# Patient Record
Sex: FEMALE | Race: WHITE
[De-identification: names, ages, dates, MRNs, and addresses within clinical notes are randomized per-mention and may not be internally consistent; named-entity substitution may affect disease eponyms.]

---

## 2018-02-19 ENCOUNTER — HOSPITAL ENCOUNTER (OUTPATIENT)
Dept: HOSPITAL 88 - DX | Age: 83
End: 2018-02-19
Attending: FAMILY MEDICINE
Payer: MEDICARE

## 2018-02-19 DIAGNOSIS — M81.0: Primary | ICD-10-CM

## 2018-02-19 PROCEDURE — 77080 DXA BONE DENSITY AXIAL: CPT

## 2018-06-10 NOTE — DIAGNOSTIC IMAGING REPORT
PROCEDURE:BONE DXA DUAL ENERGY

INDICATION: Post menopausal osteoporosis screening The patient history 

questionnaire was completed and is available on PACS.

COMPARISON:None.

FINDINGS:

Evaluation of the left hip and lumbar spine was performed utilizing 

DEXA Hologic bone densitometer.

The study is technically adequate.

The patient's fracture risk is compared to an age-matched control.

 

The patient denies prior surgery/fracture of the spine, hips or 

forearm.

 

Left femoral neck bone mineral density: 0.761 g/cm2, T-score is -0.8, 

Z-score is 1.6.

Left total hip bone mineral density: 0.744 g/cm2, T-score is -1.6, 

Z-score is 0.5.

 

Lumbar spine total bone mineral density: 1.257 g/cm2, T-score is 1.9, 

Z-score is 4.7.

 

IMPRESSION:

1. Bone mineralization by WHO Classification is osteopenia, based on 

measurements obtained of the left hip. The fracture risk isn't 

increased.

 

2. Measured bone mineral density of the lumbar spine is probably 

artifactually increased due to degenerative osteophyte formation.

 

 

 

Dictated by:  Bernabe Frankel M.D. on 2/19/2018 at 11:11     

Electronically approved by:  Bernabe Frankel M.D. on 2/19/2018 at 11:11
Attending Only

## 2019-01-20 ENCOUNTER — HOSPITAL ENCOUNTER (INPATIENT)
Dept: HOSPITAL 88 - ER | Age: 84
LOS: 4 days | Discharge: NURSING, REHAB OR LONG-TERM CARE | DRG: 470 | End: 2019-01-24
Attending: INTERNAL MEDICINE | Admitting: INTERNAL MEDICINE
Payer: MEDICARE

## 2019-01-20 VITALS — HEIGHT: 68 IN | WEIGHT: 145.06 LBS | BODY MASS INDEX: 21.99 KG/M2
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
## 2019-01-20 DIAGNOSIS — Z82.49: ICD-10-CM

## 2019-01-20 DIAGNOSIS — N17.9: ICD-10-CM

## 2019-01-20 DIAGNOSIS — N18.1: ICD-10-CM
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
## 2019-01-20 DIAGNOSIS — E03.9: ICD-10-CM
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
## 2019-01-20 DIAGNOSIS — M15.9: ICD-10-CM

## 2019-01-20 DIAGNOSIS — S72.032A: Primary | ICD-10-CM

## 2019-01-20 DIAGNOSIS — I13.10: ICD-10-CM

## 2019-01-20 DIAGNOSIS — M81.0: ICD-10-CM

## 2019-01-20 PROCEDURE — 93005 ELECTROCARDIOGRAM TRACING: CPT

## 2019-01-20 PROCEDURE — 96374 THER/PROPH/DIAG INJ IV PUSH: CPT

## 2019-01-20 PROCEDURE — 71045 X-RAY EXAM CHEST 1 VIEW: CPT

## 2019-01-20 PROCEDURE — 97139 UNLISTED THERAPEUTIC PX: CPT

## 2019-01-20 PROCEDURE — 80053 COMPREHEN METABOLIC PANEL: CPT

## 2019-01-20 PROCEDURE — 96376 TX/PRO/DX INJ SAME DRUG ADON: CPT

## 2019-01-20 PROCEDURE — 86900 BLOOD TYPING SEROLOGIC ABO: CPT

## 2019-01-20 PROCEDURE — 99284 EMERGENCY DEPT VISIT MOD MDM: CPT

## 2019-01-20 PROCEDURE — 96375 TX/PRO/DX INJ NEW DRUG ADDON: CPT

## 2019-01-20 PROCEDURE — 85025 COMPLETE CBC W/AUTO DIFF WBC: CPT

## 2019-01-20 PROCEDURE — 85730 THROMBOPLASTIN TIME PARTIAL: CPT

## 2019-01-20 PROCEDURE — 51700 IRRIGATION OF BLADDER: CPT

## 2019-01-20 PROCEDURE — 85610 PROTHROMBIN TIME: CPT

## 2019-01-20 PROCEDURE — 81001 URINALYSIS AUTO W/SCOPE: CPT

## 2019-01-20 PROCEDURE — 36415 COLL VENOUS BLD VENIPUNCTURE: CPT

## 2019-01-20 PROCEDURE — 86850 RBC ANTIBODY SCREEN: CPT

## 2019-01-20 NOTE — XMS REPORT
Patient Summary Document

                             Created on: 2019



MANISH CASTLE

External Reference #: [id number]

: 1935

Sex: Female



Demographics







                          Address                   [address] [place] [address] 

[place]  [address]

 

                          Home Phone                [phone]

 

                          Preferred Language        Unknown

 

                          Marital Status            Unknown

 

                          Jainism Affiliation     Unknown

 

                          Race                      Unknown

 

                                        Additional Race(s)  

 

                          Ethnic Group              Unknown





Author







                          Author                    [facility]

 

                          Address                   Unknown

 

                          Phone                     Unavailable







Care Team Providers







                    Care Team Member Name    Role                Phone

 

                    ERIBERTO YOUNG        Unavailable         Unavailable







Problems

This patient has no known problems.



Allergies, Adverse Reactions, Alerts

This patient has no known allergies or adverse reactions.



Medications

This patient has no known medications.



Results







           Test Description    Test Time    Test Comments    Text Results    Atomic Results    Result

 Comments

 

                BONE DXA DUAL ENERGY                                        [place] [id number]      Patient Name: MANISH CASTLE   
MR #: [id number]    : 1935 Age/Sex: 82/F  Acct #: [id number] Req #: 
[id number]  [facility] Physician:     Ordered by: ERIBERTO YOUNG MD  Report #: [id number]  
Location: DX  Room/Bed:     
_________________________________________________________________________________
__________________    Procedure: [id number] DX/BONE DXA DUAL ENERGY  Exam Date:  
                          Exam Time:        REPORT STATUS: Signed    PROCEDURE: 
 BONE DXA DUAL ENERGY   INDICATION: Post menopausal osteoporosis screening The 
patient history    questionnaire was completed and is available on PACS.   
COMPARISON:   None.   FINDINGS:      Evaluation of the left hip and lumbar spine
was performed utilizing    DEXA Hologic bone densitometer.   The study is 
technically adequate.   The patient's fracture risk is compared to an age-
matched control.       The patient denies prior surgery/fracture of the spine, 
hips or    forearm.       Left femoral neck bone mineral density: 0.761 g/cm2, 
T-score is -0.8,    Z-score is 1.6.   Left total hip bone mineral density: 0.744
g/cm2, T-score is -1.6,    Z-score is 0.5.       Lumbar spine total bone mineral
density: 1.257 g/cm2, T-score is 1.9,    Z-score is 4.7.       IMPRESSION:      
1. Bone mineralization by WHO Classification is osteopenia, based on    
measurements obtained of the left hip. The fracture risk isn't    increased.    
  2. Measured bone mineral density of the lumbar spine is probably    
artifactually increased due to degenerative osteophyte formation.               
Dictated by:  Miryam Flores M.D. on 2018 at 11:11        Electronically 
approved by:  Miryam Flores M.D. on 2018 at 11:11                   
Dictated By: MIRYAM FLORES MD  Electronically Signed By: MIRYAM FLORES MD 
on 18 1111  Transcribed By: JENNIFFER on 18 1111       COPY TO:   
ERIBERTO YOUNG MD

## 2019-01-21 VITALS — DIASTOLIC BLOOD PRESSURE: 58 MMHG | SYSTOLIC BLOOD PRESSURE: 103 MMHG

## 2019-01-21 VITALS — SYSTOLIC BLOOD PRESSURE: 132 MMHG | DIASTOLIC BLOOD PRESSURE: 66 MMHG

## 2019-01-21 VITALS — SYSTOLIC BLOOD PRESSURE: 113 MMHG | DIASTOLIC BLOOD PRESSURE: 57 MMHG

## 2019-01-21 LAB
ALBUMIN SERPL-MCNC: 3.3 G/DL (ref 3.5–5)
ALBUMIN/GLOB SERPL: 1 {RATIO} (ref 0.8–2)
ALP SERPL-CCNC: 50 IU/L (ref 40–150)
ALT SERPL-CCNC: 14 IU/L (ref 0–55)
ANION GAP SERPL CALC-SCNC: 14.9 MMOL/L (ref 8–16)
BACTERIA URNS QL MICRO: (no result) /HPF
BASOPHILS # BLD AUTO: 0.1 10*3/UL (ref 0–0.1)
BASOPHILS NFR BLD AUTO: 0.8 % (ref 0–1)
BILIRUB UR QL: NEGATIVE
BUN SERPL-MCNC: 21 MG/DL (ref 7–26)
BUN/CREAT SERPL: 20 (ref 6–25)
CALCIUM SERPL-MCNC: 9.1 MG/DL (ref 8.4–10.2)
CHLORIDE SERPL-SCNC: 100 MMOL/L (ref 98–107)
CLARITY UR: CLEAR
CO2 SERPL-SCNC: 28 MMOL/L (ref 22–29)
COLOR UR: YELLOW
DEPRECATED APTT PLAS QN: 33.7 SECONDS (ref 23.8–35.5)
DEPRECATED INR PLAS: 1.01
DEPRECATED NEUTROPHILS # BLD AUTO: 3.5 10*3/UL (ref 2.1–6.9)
DEPRECATED RBC URNS MANUAL-ACNC: (no result) /HPF (ref 0–5)
EGFRCR SERPLBLD CKD-EPI 2021: 50 ML/MIN (ref 60–?)
EOSINOPHIL # BLD AUTO: 0.6 10*3/UL (ref 0–0.4)
EOSINOPHIL NFR BLD AUTO: 8.5 % (ref 0–6)
EPI CELLS URNS QL MICRO: (no result) /LPF
ERYTHROCYTE [DISTWIDTH] IN CORD BLOOD: 13.9 % (ref 11.7–14.4)
GLOBULIN PLAS-MCNC: 3.2 G/DL (ref 2.3–3.5)
GLUCOSE SERPLBLD-MCNC: 90 MG/DL (ref 74–118)
HCT VFR BLD AUTO: 33.4 % (ref 34.2–44.1)
HGB BLD-MCNC: 12 G/DL (ref 12–16)
HYALINE CASTS #/AREA URNS LPF: (no result) /[LPF] (ref 0–1)
KETONES UR QL STRIP.AUTO: (no result)
LEUKOCYTE ESTERASE UR QL STRIP.AUTO: NEGATIVE
LYMPHOCYTES # BLD: 1.8 10*3/UL (ref 1–3.2)
LYMPHOCYTES NFR BLD AUTO: 27.8 % (ref 18–39.1)
MCH RBC QN AUTO: 32.8 PG (ref 28–32)
MCHC RBC AUTO-ENTMCNC: 35.9 G/DL (ref 31–35)
MCV RBC AUTO: 91.3 FL (ref 81–99)
MONOCYTES # BLD AUTO: 0.6 10*3/UL (ref 0.2–0.8)
MONOCYTES NFR BLD AUTO: 9.4 % (ref 4.4–11.3)
MUCOUS THREADS URNS QL MICRO: (no result)
NEUTS SEG NFR BLD AUTO: 53 % (ref 38.7–80)
NITRITE UR QL STRIP.AUTO: NEGATIVE
PLATELET # BLD AUTO: 242 X10E3/UL (ref 140–360)
POTASSIUM SERPL-SCNC: 3.9 MMOL/L (ref 3.5–5.1)
PROT UR QL STRIP.AUTO: NEGATIVE
PROTHROMBIN TIME: 14.2 SECONDS (ref 11.9–14.5)
RBC # BLD AUTO: 3.66 X10E6/UL (ref 3.6–5.1)
SODIUM SERPL-SCNC: 139 MMOL/L (ref 136–145)
SP GR UR STRIP: 1.03 (ref 1.01–1.02)
UROBILINOGEN UR STRIP-MCNC: 0.2 MG/DL (ref 0.2–1)
WBC #/AREA URNS HPF: (no result) /HPF (ref 0–5)

## 2019-01-21 RX ADMIN — Medication SCH MG: at 10:48

## 2019-01-21 RX ADMIN — Medication SCH MG: at 15:13

## 2019-01-21 RX ADMIN — HYDROMORPHONE HYDROCHLORIDE PRN MG: 2 INJECTION INTRAMUSCULAR; INTRAVENOUS; SUBCUTANEOUS at 16:00

## 2019-01-21 RX ADMIN — SODIUM CHLORIDE PRN MG: 900 INJECTION INTRAVENOUS at 12:25

## 2019-01-21 RX ADMIN — HYDROMORPHONE HYDROCHLORIDE PRN MG: 2 INJECTION INTRAMUSCULAR; INTRAVENOUS; SUBCUTANEOUS at 06:30

## 2019-01-21 RX ADMIN — Medication SCH MG: at 21:15

## 2019-01-21 RX ADMIN — SODIUM CHLORIDE, POTASSIUM CHLORIDE, SODIUM LACTATE AND CALCIUM CHLORIDE SCH MLS/HR: 600; 310; 30; 20 INJECTION, SOLUTION INTRAVENOUS at 17:12

## 2019-01-21 RX ADMIN — HYDROMORPHONE HYDROCHLORIDE PRN MG: 2 INJECTION INTRAMUSCULAR; INTRAVENOUS; SUBCUTANEOUS at 19:50

## 2019-01-21 RX ADMIN — HYDROMORPHONE HYDROCHLORIDE PRN MG: 2 INJECTION INTRAMUSCULAR; INTRAVENOUS; SUBCUTANEOUS at 12:30

## 2019-01-21 NOTE — NUR
CONSULTATION - ORTHOPEDICS



Callie is a 83 year old community ambulating female without assistive device who 
presented to the ED after a mechanical fall with complaint of left hip pain and 
an inability to ambulate.  Beside her general soreness, she denies pain in any 
other extremity and denies numbness, paresthesias or loss of distal motor 
function.  Pain mainly localized to left hip. 



PMdHx: Hypothyroidism, Osteoporosis, Hypertensive heart disease, Stage 1 
chronic kidney disease, Generalized osteoarthritis.

SurgHx: Appendectomy, Tonsillectomy, Right ankle surgery, Right knee 
arthroscopy, Cataract surgery, Left heart catheterization

FamHx: Non-contributory

Allergies: NKDA. 

SocHx: Retired, Denies Tobacco, Alcohol and Drug Use 

Medications: See Med Reconciliation

 

VS:  T 97.9, HR 98, RR 16, /75, O2 Sat 98% (2.0L)



Alert, Awake & Oriented, NAD

Bilateral Upper Extremity

No open lesions or sores, no gross deformity

+ AROM of digits, wrist, elbow, shoulder

Motor: + AIN, PIN, Radial, Median, Ulnar

Sensation grossly intact

Pulses + Radial, + capillary refill

Compartments soft



Lower Extremity

Right Lower Extremity: No pain with log roll, heal strike, no gross deformity

Left Lower Extremity: Shortened, flexed, externally rotated

Motor: + EHL, FHL, TA, G/S

Sensation grossly intact

Pulses + DP, Post tib

Compartments soft

Negative calf tenderness



Xrays demonstrate displaced left femoral neck fracture



83 year old female with displaced left femoral neck fracture

Plan for OR in am for left hip hemiarthroplasty

Analgesics PRN

DVT Prophylaxis

Bedrest

NPO except meds after midnight

Hold anticoagulation after midnight

IVF while NPO

Medical and Cardiology notes appreciated.



DO [facility]

## 2019-01-21 NOTE — CONSULTATION
DATE OF CONSULTATION:  January 21, 2019 



CARDIOLOGY CONSULTATION 



ATTENDING PHYSICIAN:  Dr. Terry.



Thank you so much for asking me to see this nice lady again in 

consultation.



Ms. Cardoso is a charming 83-year-old woman seen in my office earlier this 

month, who presented to the emergency room overnight after falling down on 

her patio and fracturing her left hip.



HISTORY OF PRESENT ILLNESS:  The patient reports she went out to see the 

lunar eclipse and was having trouble seeing it but slipped down on a rubber 

mat and fractured her left hip. She denies any palpitations or any syncope. 





PAST MEDICAL HISTORY:  Significant for hypertension. She had a cardiac 

catheterization in 2010, found to have only minimal coronary disease. She 

has had previous left trigeminal neuralgia.



SURGICAL HISTORY:  Remote tonsillectomy and appendectomy.



HOME MEDICATIONS:  Have been Synthroid 50 mcg daily, calcium, vitamin D, 

tramadol, meloxicam, gabapentin 600 mg t.i.d., and Micardis 40 mg daily.



PERSONAL AND SOCIAL HISTORY:  She does not smoke nor drink. 



PHYSICAL EXAMINATION

GENERAL:  At this time shows a pleasant, elderly woman, alert, responsive, 

oriented, normotensive.

HEAD, EYES, EARS, NOSE AND THROAT:  Unremarkable.

NECK:  No jugular venous distention.

THORAX:  Heart sounds S1 and S2 are equal, no murmurs.

LUNGS:  Clear. 

ABDOMEN:  Scaphoid. Normal bowel sounds.

EXTREMITIES:  Have no cyanosis, clubbing or edema.



EKG shows sinus tachycardia.



INITIAL LABORATORY STUDIES:  Were unrevealing with a creatinine of 1.0. INR 

1.0. 



ASSESSMENTS

1. Left femoral neck fracture.

2. Hypertension, controlled.

3. No significant coronary disease.



PLAN:  I believe she is a standard risk for a hip repair for an 

83-year-old. Will continue home medications and follow with you.



Thank you for asking me to see her in consultation.



 





DD:  01/21/2019 12:10

DT:  01/21/2019 12:43

Job#:  [id number]

cc:MD DR. KALPANA ZIEGLER

## 2019-01-21 NOTE — DIAGNOSTIC IMAGING REPORT
EXAM: CHEST SINGLE (PORTABLE), AP 1 view

INDICATION: Fall, left hip pain

COMPARISON: None



FINDINGS:

LINES/TUBES: None



LUNGS: No consolidations or edema. 



PLEURA: No effusions or pneumothorax.



HEART AND MEDIASTINUM: The heart is within normal size limits. Prominence of

the right paratracheal stripe is likely secondary to rotation.



BONES AND SOFT TISSUES: No acute findings. 



IMPRESSION:

No acute thoracic abnormality.







Signed by: Dr. Kusum Lisa M.D. on 1/21/2019 1:27 AM

## 2019-01-21 NOTE — DIAGNOSTIC IMAGING REPORT
Exam: Left femur one view

Indication: Fall, left hip pain

Comparison: Pelvic and left hip x-rays January 21, 2019



Impression:

Acute fracture of the left proximal femoral neck with shortening. 

No additional femoral fractures.

No dislocation.



Signed by: Dr. Kusum Lisa M.D. on 1/21/2019 2:33 AM

## 2019-01-21 NOTE — DIAGNOSTIC IMAGING REPORT
EXAM: HIP LEFT 2-3 VW (+/- PELVIS)

INDICATION: Fall, left hip pain

COMPARISON: None



FINDINGS:

BONES: 

Acute fracture of the left proximal femoral neck with shortening.



JOINTS:

No dislocation. Degenerative changes of the bilateral hips and pubic symphysis

and sacroiliac joints and lower lumbar spine.



SOFT TISSUES:

Normal



IMPRESSION:

Acute fracture of the left proximal femoral neck.





Signed by: Dr. Kusum Lisa M.D. on 1/21/2019 1:26 AM

## 2019-01-21 NOTE — HISTORY AND PHYSICAL
CHIEF COMPLAINT:  "I fell down."



HISTORY OF PRESENT ILLNESS:  This is an 83-year-old white woman who 

presents to [facility] with complaints of 

intense left hip pain.  The patient states she suffered a mechanical fall 

last night at approximately 11 p.m. when she went outside to see the lunar 

eclipse.  The patient denies any syncopal or presyncopal symptoms prior to 

falling.  The patient states she has intense pain of the left hip in the 

left hip area.  In the emergency room, the patient had a left femur x-ray 

performed that revealed an acute fracture of the left proximal femoral neck 

with shortening.  The patient's complete blood count and comprehensive 

metabolic profile were unremarkable.  The patient was admitted for further 

evaluation and treatment.



REVIEW OF SYSTEMS 

GENERAL:  Weight has been stable.  No fever or chills.

HEENT:  No headaches.  No visual changes.  

CARDIOVASCULAR/RESPIRATORY:  The patient was recently diagnosed with 

pneumonia, but her cough and congestion has improved significantly.  The 

patient denies any chest pain or tiredness.  

GI:  No nausea, vomiting, constipation.

:  No dysuria.  No hematuria.  No constipation.

NEUROMUSCULAR:  Complains of left hip pain.



PAST MEDICAL HISTORY 

1.  Hypothyroidism.

2.  Osteoporosis. 

3.  Hypertensive heart disease.

4.  Stage 1 chronic kidney disease. 

5.  Generalized osteoarthritis.



SURGICAL HISTORY 

1.  Appendectomy.

2.  Tonsillectomy.

3.  Right ankle surgery.

4.  Right knee arthroscopy.

5.  Cataract surgery.

6.  Left heart catheterization, but no percutaneous coronary intervention 

performed.



FAMILY HISTORY:  Both parents have hypertension and mother had coronary 

artery bypass grafting.  



ALLERGIES:  NO KNOWN DRUG ALLERGIES. 



SOCIAL HISTORY:  The woman is  and lives with her .  She was 

exposed to second-hand tobacco smoke from her  up until 1980.  She 

is retired.  She denies any alcohol use.



MEDICATIONS 

1.  Meloxicam 15 mg daily.

2.  Levothyroxine 50 mcg daily.

3.  Tramadol 50 mg q.i.d. p.r.n. pain.

4.  Alendronate 70 mg daily.

5.  Gabapentin 600 mg daily.

6.  Losartan 40 mg daily.

7.  Vitamin D3 2000 units daily.

8.  Calcium with vitamin D t.i.d. 

9.  Multivitamin 1 daily.



PHYSICAL EXAMINATION 

GENERAL:  She is awake, alert, fully awake, and pleasant on exam.

VITALS:  Height is 5 feet 8 inches, weight is 130 pounds, BMI 19, blood 

pressure is 128/62, heart rate is 100, respiratory rate is 18, oxygen 

saturation 98% on room air, temp 97.8.

INTEGUMENT:  Skin is warm and dry.  No pallor, conjunctivitis or 

diaphoresis.

HEENT:  Anicteric.  Moist mucous membranes.

NECK:  Supple.



CARDIOVASCULAR:  Tachycardic rate with regular rhythm.  The patient has an 

S3 and S4 gallops.

LUNGS:  She has coarse breath sounds bilaterally, but no rales, rhonchi or 

wheezes appreciated. 

EXTREMITIES:  The patient's left lower extremity is shorter than the right 

and it is externally rotated.

NEUROLOGIC:  Intact.  



IMPRESSION 

1.  Left proximal femoral neck fracture secondary to mechanical fall.

2.  Hypertensive heart disease.

3.  Stage 1 chronic kidney disease.

4.  Osteoporosis. 

5.  Generalized osteoarthritis.



PLAN 

1.  Will order enoxaparin to prevent deep venous thrombosis.

2.  Regular diet.

3.  N.p.o. after midnight.

4.  I spoke with orthopedic surgery, and she will tentatively undergo left 

hip surgery tomorrow on Tuesday, January 22, 2019.  

5.  Pain control.

6.  Renew home medications.  



I spent an hour in the care of this patient. 

  





DD:  01/21/2019 09:55

DT:  01/21/2019 10:07

Job#:  [id number] SRINIVAS OSBORNE

## 2019-01-21 NOTE — NUR
THIS NURSE ATTEMPTED TO PLACE PATIENT ON HOSPITAL BED, FAMILY IN ROOM SAID THE 
PATIENT IS RESTING PEACFULLY AND THEY WILL LET US KNOW WHEN THEY ARE READY. 
EXPLAINED THE RISK OF SKIN BREAKDOWN. PATIENT WAS ASKED IF SHE WANTED TO STAY 
IN ER BED, PATIENT STATED SHE DID NOT WANT TO BE MOVED TO HOSPITAL BED UNTIL 
LATER.

## 2019-01-22 VITALS — DIASTOLIC BLOOD PRESSURE: 58 MMHG | SYSTOLIC BLOOD PRESSURE: 131 MMHG

## 2019-01-22 VITALS — SYSTOLIC BLOOD PRESSURE: 120 MMHG | DIASTOLIC BLOOD PRESSURE: 61 MMHG

## 2019-01-22 VITALS — SYSTOLIC BLOOD PRESSURE: 96 MMHG | DIASTOLIC BLOOD PRESSURE: 55 MMHG

## 2019-01-22 VITALS — SYSTOLIC BLOOD PRESSURE: 138 MMHG | DIASTOLIC BLOOD PRESSURE: 74 MMHG

## 2019-01-22 VITALS — SYSTOLIC BLOOD PRESSURE: 113 MMHG | DIASTOLIC BLOOD PRESSURE: 74 MMHG

## 2019-01-22 VITALS — SYSTOLIC BLOOD PRESSURE: 131 MMHG | DIASTOLIC BLOOD PRESSURE: 58 MMHG

## 2019-01-22 VITALS — DIASTOLIC BLOOD PRESSURE: 62 MMHG | SYSTOLIC BLOOD PRESSURE: 130 MMHG

## 2019-01-22 LAB
ALBUMIN SERPL-MCNC: 2.5 G/DL (ref 3.5–5)
ALBUMIN SERPL-MCNC: 2.6 G/DL (ref 3.5–5)
ALBUMIN/GLOB SERPL: 0.8 {RATIO} (ref 0.8–2)
ALBUMIN/GLOB SERPL: 0.9 {RATIO} (ref 0.8–2)
ALP SERPL-CCNC: 43 IU/L (ref 40–150)
ALP SERPL-CCNC: 46 IU/L (ref 40–150)
ALT SERPL-CCNC: 13 IU/L (ref 0–55)
ALT SERPL-CCNC: 14 IU/L (ref 0–55)
ANION GAP SERPL CALC-SCNC: 10 MMOL/L (ref 8–16)
ANION GAP SERPL CALC-SCNC: 11.2 MMOL/L (ref 8–16)
BASOPHILS # BLD AUTO: 0.1 10*3/UL (ref 0–0.1)
BASOPHILS # BLD AUTO: 0.1 10*3/UL (ref 0–0.1)
BASOPHILS NFR BLD AUTO: 0.4 % (ref 0–1)
BASOPHILS NFR BLD AUTO: 0.7 % (ref 0–1)
BUN SERPL-MCNC: 13 MG/DL (ref 7–26)
BUN SERPL-MCNC: 13 MG/DL (ref 7–26)
BUN/CREAT SERPL: 16 (ref 6–25)
BUN/CREAT SERPL: 17 (ref 6–25)
CALCIUM SERPL-MCNC: 8.3 MG/DL (ref 8.4–10.2)
CALCIUM SERPL-MCNC: 8.3 MG/DL (ref 8.4–10.2)
CHLORIDE SERPL-SCNC: 100 MMOL/L (ref 98–107)
CHLORIDE SERPL-SCNC: 99 MMOL/L (ref 98–107)
CO2 SERPL-SCNC: 27 MMOL/L (ref 22–29)
CO2 SERPL-SCNC: 28 MMOL/L (ref 22–29)
DEPRECATED NEUTROPHILS # BLD AUTO: 10.5 10*3/UL (ref 2.1–6.9)
DEPRECATED NEUTROPHILS # BLD AUTO: 5.2 10*3/UL (ref 2.1–6.9)
EGFRCR SERPLBLD CKD-EPI 2021: > 60 ML/MIN (ref 60–?)
EGFRCR SERPLBLD CKD-EPI 2021: > 60 ML/MIN (ref 60–?)
EOSINOPHIL # BLD AUTO: 0.3 10*3/UL (ref 0–0.4)
EOSINOPHIL # BLD AUTO: 0.7 10*3/UL (ref 0–0.4)
EOSINOPHIL NFR BLD AUTO: 2.2 % (ref 0–6)
EOSINOPHIL NFR BLD AUTO: 9.1 % (ref 0–6)
ERYTHROCYTE [DISTWIDTH] IN CORD BLOOD: 14.1 % (ref 11.7–14.4)
ERYTHROCYTE [DISTWIDTH] IN CORD BLOOD: 14.1 % (ref 11.7–14.4)
GLOBULIN PLAS-MCNC: 2.9 G/DL (ref 2.3–3.5)
GLOBULIN PLAS-MCNC: 3.1 G/DL (ref 2.3–3.5)
GLUCOSE SERPLBLD-MCNC: 107 MG/DL (ref 74–118)
GLUCOSE SERPLBLD-MCNC: 114 MG/DL (ref 74–118)
HCT VFR BLD AUTO: 33.7 % (ref 34.2–44.1)
HCT VFR BLD AUTO: 35.6 % (ref 34.2–44.1)
HGB BLD-MCNC: 11.1 G/DL (ref 12–16)
HGB BLD-MCNC: 11.8 G/DL (ref 12–16)
LYMPHOCYTES # BLD: 1.3 10*3/UL (ref 1–3.2)
LYMPHOCYTES # BLD: 1.7 10*3/UL (ref 1–3.2)
LYMPHOCYTES NFR BLD AUTO: 12.9 % (ref 18–39.1)
LYMPHOCYTES NFR BLD AUTO: 16.1 % (ref 18–39.1)
MCH RBC QN AUTO: 30.2 PG (ref 28–32)
MCH RBC QN AUTO: 30.8 PG (ref 28–32)
MCHC RBC AUTO-ENTMCNC: 32.9 G/DL (ref 31–35)
MCHC RBC AUTO-ENTMCNC: 33.1 G/DL (ref 31–35)
MCV RBC AUTO: 91.6 FL (ref 81–99)
MCV RBC AUTO: 93 FL (ref 81–99)
MONOCYTES # BLD AUTO: 0.5 10*3/UL (ref 0.2–0.8)
MONOCYTES # BLD AUTO: 0.8 10*3/UL (ref 0.2–0.8)
MONOCYTES NFR BLD AUTO: 4 % (ref 4.4–11.3)
MONOCYTES NFR BLD AUTO: 9.2 % (ref 4.4–11.3)
NEUTS SEG NFR BLD AUTO: 64.4 % (ref 38.7–80)
NEUTS SEG NFR BLD AUTO: 79.8 % (ref 38.7–80)
PLATELET # BLD AUTO: 210 X10E3/UL (ref 140–360)
PLATELET # BLD AUTO: 219 X10E3/UL (ref 140–360)
POTASSIUM SERPL-SCNC: 4 MMOL/L (ref 3.5–5.1)
POTASSIUM SERPL-SCNC: 4.2 MMOL/L (ref 3.5–5.1)
RBC # BLD AUTO: 3.68 X10E6/UL (ref 3.6–5.1)
RBC # BLD AUTO: 3.83 X10E6/UL (ref 3.6–5.1)
SODIUM SERPL-SCNC: 133 MMOL/L (ref 136–145)
SODIUM SERPL-SCNC: 134 MMOL/L (ref 136–145)

## 2019-01-22 PROCEDURE — 0SRS01Z REPLACEMENT OF LEFT HIP JOINT, FEMORAL SURFACE WITH METAL SYNTHETIC SUBSTITUTE, OPEN APPROACH: ICD-10-PCS | Performed by: ORTHOPAEDIC SURGERY

## 2019-01-22 RX ADMIN — CEFAZOLIN SODIUM SCH MLS/HR: 2 SOLUTION INTRAVENOUS at 14:48

## 2019-01-22 RX ADMIN — TELMISARTAN SCH MG: 40 TABLET ORAL at 08:12

## 2019-01-22 RX ADMIN — HYDROMORPHONE HYDROCHLORIDE PRN MG: 2 INJECTION INTRAMUSCULAR; INTRAVENOUS; SUBCUTANEOUS at 05:20

## 2019-01-22 RX ADMIN — CHOLECALCIFEROL (VITAMIN D3) SCH UNIT: 25 TAB ORAL at 08:12

## 2019-01-22 RX ADMIN — Medication SCH MG: at 21:55

## 2019-01-22 RX ADMIN — LEVOTHYROXINE SODIUM SCH MCG: 50 TABLET ORAL at 04:52

## 2019-01-22 RX ADMIN — SODIUM CHLORIDE, POTASSIUM CHLORIDE, SODIUM LACTATE AND CALCIUM CHLORIDE SCH MLS/HR: 600; 310; 30; 20 INJECTION, SOLUTION INTRAVENOUS at 06:00

## 2019-01-22 RX ADMIN — Medication SCH MG: at 08:12

## 2019-01-22 RX ADMIN — Medication SCH MG: at 15:45

## 2019-01-22 RX ADMIN — CEFAZOLIN SODIUM SCH MLS/HR: 2 SOLUTION INTRAVENOUS at 21:55

## 2019-01-22 RX ADMIN — HYDROMORPHONE HYDROCHLORIDE PRN MG: 2 INJECTION INTRAMUSCULAR; INTRAVENOUS; SUBCUTANEOUS at 00:07

## 2019-01-22 RX ADMIN — ACETAMINOPHEN PRN MG: 10 INJECTION, SOLUTION INTRAVENOUS at 06:00

## 2019-01-22 NOTE — DIAGNOSTIC IMAGING REPORT
EXAM: HIP LEFT ONE VIEW / OR



INDICATION: X-ray for surgical evaluation status post left hip arthroplasty.



COMPARISON: Intraoperative left hip radiographs 1/22/2019. Left hip presurgical

radiographs 1/20/2019.



FINDINGS:

Intraoperative portable radiograph. Status post left total hip arthroplasty

with intact hardware and unremarkable alignment. No evidence of interval

fracture. There is overlying subcutaneous gas consistent with recent surgery. 



Mild degenerative changes of the right hip. Diffuse osteopenia. Bowel gas

partially obscures visualization of the sacrum and left hemipelvis.



IMPRESSION:

Post surgical changes status post left total hip arthroplasty as above.



Signed by: Dr. Varun Trent MD on 1/22/2019 10:30 AM

## 2019-01-22 NOTE — NUR
OPERATIVE NOTE - ORTHOPEDICS

PREOPERATIVE DIAGNOSES: Left Displaced Femoral Neck Fracture

POSTOPERATIVE DIAGNOSES: Left Displaced Femoral Neck Fracture and Complete 
Abductor Tendon Tear

PROCEDURE:  LeftHip Hemiarthroplasty and Abductor Tendon Repair

SURGEON: Lizet Grey DO

ASSISTANT: Rodrigo Babin

ANESTHESIA: General.

EBL: 100cc

ANESTHESIA: General

COMPLICATIONS: None

IMPLANTS: Varsha Accolade  Neck Angle Hip Stem Size #4, 35mm Neck 
length, 105 mm Stem Length & V40 Taper

[place] UHR Bingham Head Bipolar Component OD 47 mm, ID 28 mm

Styker LFIT V40 Femoral Head OD 28 mm, Offset 0 mm



PROCEDURE:The patient was bought to the operating room and placedunder 
general

anesthesia and Ancef antibiotics and TXA were provided. After induction of 
anesthesia, the patient was

turned on therightside and secured in the hip table with all bony prominences 
well

padded. The leg was prepped and draped in standard sterile fashion. A timeout 
was performed

confirming patient and laterality. An incision was made, centered over 
theposterior portion

ofgreater trochanter. Dissection was sharply carried down through the 
subcutaneous tissues

with controlled hemostasis. The gluteus maximusand fascia latawas incised and 
split

proximally and distally. There was a significant bursitis with a large 
expression of bursal fluid.  Under the bursa there was a large complete hip 
abductor tear with significant scarring of the lateral and posterior 
trochanteric structures.

The piriformis and external rotators were identified. These were

removed from their insertions on the greater trochanterand tagged with 
stitchesas a sleeve. A

T-capsulotomy was performed for exposure to the fractured femoral neck and 
head. The distal

portion of the capsule and external rotators were reflected to protect the 
sciatic nerve. A

femoral neck cut was made above the lesser trochanter and the femoral neck and 
head were

removed. The femoral head was measured to be a 50 mm.All bony remnants 
within the

acetabulum and parts of the capsule were removed and focus was on preparation 
of the

femur.



The femur was then flexed and internally rotated. The extra trochanteric bone 
was removed, as

was any leftover lateral soft tissue at the piriformis insertion. An 
intramedullary hole was drilled

into the femur to define the canal. Reaming was performed until the appropriate 
size was

reached. The broaches were then used to prepare the femur with the appropriate 
amount of

version. Once the appropriate size broach was reached, it was used as a trial 
with head and

neck placement. Hip range-of-motion was checked in all planes, including 
flexion-internal

rotation, the position of sleep, and extension-external rotation. The hip was 
found to have

excellent stability with the final chosen head-neck combination. Intraoperative 
x-rays were

obtained to ensure adequate canal filling and leg lengths. Xray were 
interpreted and showed

good alignment, length and size. Leg length measurements were taken and found 
to be within

acceptable range, given the necessity for stability.



The canal was thoroughly washed and a Varsha Accolade II #4 size neck length 
35mm, stem

lenght 105mm V40 Taper with 132 degree degreestem was opened and impactedin 
the

appropriate version. The UHR Bingham Head Bipolar Component 47 mm 28 mm with 
the



Styker LFit V40 Femoral Head with 28 mm and 0 mm Offset was used.Range of 
motion and

stability were once again checked and found to be excellent. Adequate 
hemostasis was

obtained. Vigorous power irrigation was used to remove all debris from the 
joint prior to final

reduction.



The arthrotomy and rotators were closed using Ethibond through drill holes in 
the bone,

recreating the posterior hip structural anatomy. The gluteus medius and minimus 
were repaired by lightly decorticating lateral portion of the greater 
trochanter and placing fiber wire in a margin convergence pattern and then 
placing additional stitches through a 4.75 Arthrex Swivel lock. The gluteus 
sherry was repaired using Vicryl.

The subcutaneous tissues were closed after further irrigation with 2-0 Vicryl 
and Monocryl

sutures.An analgesic cocktail was injected into the area for pain control.The 
skincovered with

steristrips and an Aquacel dressing.Final Xrays were obtained in the 
operating room and

interpreted by me demonstrating adequate size, length and position of the final 
implant. The

patient was transferred to the recovery room in stable condition, having 
tolerated the

procedure well.



DO [facility]

## 2019-01-22 NOTE — NUR
PROGRESS NOTE - ORTHOPEDIC POST OP CHECK



Patient seen & examined while in recovery room.  Pain controlled



AVSS



Left Hip - Dressing clean, dry and intact

Motor: EHL, FHL, TA, G/S

Sensation grossly intact

Pulses + DP, Post tib

Compartments soft

Negative calf tenderness



H/H 11.8/35.6



83 year old F s/p Left Hip Hemiarthroplasty POD#0

Analgesics PRN

Start Lovenox tomorrow

Follow up am labs

PT - WBAT, with posterior hip precautions



DO [facility]

## 2019-01-22 NOTE — NUR
Visit made by the Spiritual Care Department Pastoral Visitor, Belén Parham. Pt sleeping 
soundly and pt's family at bedside. PV provided pastoral presence, hospitality, and 
supportive listening. 



Pastoral Visitor informed family of the scope of Chaplaincy Services and availability.



JOEL BRODY

[facility]

Spiritual Care Department

O: [phone]

Pager: [phone] ([phone] number calling [phone])

## 2019-01-22 NOTE — DIAGNOSTIC IMAGING REPORT
EXAM: HIP LEFT ONE VIEW / OR



INDICATION: Intraoperative radiograph for hardware placement.



COMPARISON: Left hip radiographs 1/20/2019.



FINDINGS:

Intraoperative portable radiograph, which limits evaluation. There has been

interval resection of the proximal left femur with placement of a left femoral

stem prosthesis. The femoral head component has not yet been placed. Hardware

appears intact with unremarkable alignment. There is overlying subcutaneous gas

consistent with recent surgery. Overlying lines. 



The right hip is partially obscured and demonstrates degenerative changes.

There is diffuse osteopenia. Overlying wires and Ivey catheter are present. 



IMPRESSION:

Post surgical changes of the left femur as above. 



Signed by: Dr. Varun Trent MD on 1/22/2019 10:28 AM

## 2019-01-22 NOTE — NUR
PT OFF THE FLOOR TO OR. RN STATED CONSENT WOULD BE DONE IN OR. WHEN OR CALLED TO CHECK ON 
PT'S READINESS, THIS NURSE LET THEM KNOW PT WAS READY WITH THE EXCEPTION OF CONSENT. THERE 
WAS NO ORDER. THEY STATED THAT  WOULD BE CALLED FOR ORDER.

## 2019-01-22 NOTE — NUR
LUCIA JC REGARDING PT TEMP READING 101.0. AWAITING CALL BACK.

-------------------------------------------------------------------------------

Addendum: 01/22/19 at 0528 by Andre Alicea RN

-------------------------------------------------------------------------------

COLD COMPRESS APPLIED TO FOREHEAD.

## 2019-01-23 VITALS — SYSTOLIC BLOOD PRESSURE: 123 MMHG | DIASTOLIC BLOOD PRESSURE: 59 MMHG

## 2019-01-23 VITALS — DIASTOLIC BLOOD PRESSURE: 80 MMHG | SYSTOLIC BLOOD PRESSURE: 117 MMHG

## 2019-01-23 VITALS — DIASTOLIC BLOOD PRESSURE: 63 MMHG | SYSTOLIC BLOOD PRESSURE: 106 MMHG

## 2019-01-23 VITALS — DIASTOLIC BLOOD PRESSURE: 62 MMHG | SYSTOLIC BLOOD PRESSURE: 106 MMHG

## 2019-01-23 VITALS — SYSTOLIC BLOOD PRESSURE: 130 MMHG | DIASTOLIC BLOOD PRESSURE: 63 MMHG

## 2019-01-23 VITALS — DIASTOLIC BLOOD PRESSURE: 63 MMHG | SYSTOLIC BLOOD PRESSURE: 132 MMHG

## 2019-01-23 VITALS — SYSTOLIC BLOOD PRESSURE: 117 MMHG | DIASTOLIC BLOOD PRESSURE: 80 MMHG

## 2019-01-23 LAB
ALBUMIN SERPL-MCNC: 2.3 G/DL (ref 3.5–5)
ALBUMIN/GLOB SERPL: 0.8 {RATIO} (ref 0.8–2)
ALP SERPL-CCNC: 46 IU/L (ref 40–150)
ALT SERPL-CCNC: 9 IU/L (ref 0–55)
ANION GAP SERPL CALC-SCNC: 11.2 MMOL/L (ref 8–16)
BASOPHILS # BLD AUTO: 0 10*3/UL (ref 0–0.1)
BASOPHILS NFR BLD AUTO: 0.3 % (ref 0–1)
BUN SERPL-MCNC: 18 MG/DL (ref 7–26)
BUN/CREAT SERPL: 21 (ref 6–25)
CALCIUM SERPL-MCNC: 8.6 MG/DL (ref 8.4–10.2)
CHLORIDE SERPL-SCNC: 102 MMOL/L (ref 98–107)
CO2 SERPL-SCNC: 28 MMOL/L (ref 22–29)
DEPRECATED NEUTROPHILS # BLD AUTO: 9 10*3/UL (ref 2.1–6.9)
EGFRCR SERPLBLD CKD-EPI 2021: > 60 ML/MIN (ref 60–?)
EOSINOPHIL # BLD AUTO: 0.1 10*3/UL (ref 0–0.4)
EOSINOPHIL NFR BLD AUTO: 1 % (ref 0–6)
ERYTHROCYTE [DISTWIDTH] IN CORD BLOOD: 14.2 % (ref 11.7–14.4)
GLOBULIN PLAS-MCNC: 3 G/DL (ref 2.3–3.5)
GLUCOSE SERPLBLD-MCNC: 112 MG/DL (ref 74–118)
HCT VFR BLD AUTO: 31.5 % (ref 34.2–44.1)
HGB BLD-MCNC: 10.6 G/DL (ref 12–16)
LYMPHOCYTES # BLD: 1.4 10*3/UL (ref 1–3.2)
LYMPHOCYTES NFR BLD AUTO: 11.9 % (ref 18–39.1)
MCH RBC QN AUTO: 30.5 PG (ref 28–32)
MCHC RBC AUTO-ENTMCNC: 33.7 G/DL (ref 31–35)
MCV RBC AUTO: 90.8 FL (ref 81–99)
MONOCYTES # BLD AUTO: 0.9 10*3/UL (ref 0.2–0.8)
MONOCYTES NFR BLD AUTO: 7.9 % (ref 4.4–11.3)
NEUTS SEG NFR BLD AUTO: 78.6 % (ref 38.7–80)
PLATELET # BLD AUTO: 229 X10E3/UL (ref 140–360)
POTASSIUM SERPL-SCNC: 4.2 MMOL/L (ref 3.5–5.1)
RBC # BLD AUTO: 3.47 X10E6/UL (ref 3.6–5.1)
SODIUM SERPL-SCNC: 137 MMOL/L (ref 136–145)

## 2019-01-23 RX ADMIN — Medication SCH MG: at 15:30

## 2019-01-23 RX ADMIN — TELMISARTAN SCH MG: 40 TABLET ORAL at 09:45

## 2019-01-23 RX ADMIN — HYDROCODONE BITARTRATE AND ACETAMINOPHEN PRN EA: 5; 325 TABLET ORAL at 18:39

## 2019-01-23 RX ADMIN — HYDROMORPHONE HYDROCHLORIDE PRN MG: 2 INJECTION INTRAMUSCULAR; INTRAVENOUS; SUBCUTANEOUS at 08:19

## 2019-01-23 RX ADMIN — Medication SCH MG: at 20:38

## 2019-01-23 RX ADMIN — HYDROCODONE BITARTRATE AND ACETAMINOPHEN PRN EA: 5; 325 TABLET ORAL at 12:42

## 2019-01-23 RX ADMIN — LEVOTHYROXINE SODIUM SCH MCG: 50 TABLET ORAL at 05:54

## 2019-01-23 RX ADMIN — SODIUM CHLORIDE PRN MG: 900 INJECTION INTRAVENOUS at 08:19

## 2019-01-23 RX ADMIN — ACETAMINOPHEN PRN MG: 10 INJECTION, SOLUTION INTRAVENOUS at 04:14

## 2019-01-23 RX ADMIN — HYDROCODONE BITARTRATE AND ACETAMINOPHEN PRN EA: 5; 325 TABLET ORAL at 22:37

## 2019-01-23 RX ADMIN — Medication SCH MG: at 09:45

## 2019-01-23 RX ADMIN — CEFAZOLIN SODIUM SCH MLS/HR: 2 SOLUTION INTRAVENOUS at 05:54

## 2019-01-23 RX ADMIN — CHOLECALCIFEROL (VITAMIN D3) SCH UNIT: 25 TAB ORAL at 09:45

## 2019-01-23 NOTE — NUR
ASSESSMENT COMPLETE NO DISTRESS NOTED, UPDATED ON POC VOICED UNDERSTANDING, DENIES PAIN AT 
THIS TIME, DSG TO L HIP C/D/I, NO OTHER CO VOCIED CALL LIGHT IN REACH WILL CONTINUE TO 
MONITOR

## 2019-01-23 NOTE — DIAGNOSTIC IMAGING REPORT
EXAMINATION:  CHEST SINGLE (PORTABLE)    



INDICATION: increased fever, and post surgery



COMPARISON:  1/21/2019

     

FINDINGS:  AP view   



TUBES and LINES:  None.



LUNGS:  Lungs are well inflated.  Mild bibasilar atelectasis.   There is no

evidence of pneumonia or pulmonary edema.



PLEURA:  No pleural effusion or pneumothorax.



HEART AND MEDIASTINUM:  The cardiomediastinal silhouette is unremarkable.    



BONES AND SOFT TISSUES:  No acute osseous lesion.  Soft tissues are

unremarkable.



UPPER ABDOMEN: No free air under the diaphragm.    



IMPRESSION: 

No acute thoracic abnormality.





Signed by: DR. Jim Mccarthy MD on 1/23/2019 6:11 AM

## 2019-01-23 NOTE — NUR
SPOKE WITH PATIENT  AND DAUGHTER ABOUT SNF REFERRAL, GAVE OPTIONS OF FACILITIES IN 
AREA. PT DAUGHTER STATES SHE KNOWS A PT TECH AT [facility] AND THEY SIGNED CHOICE FOR 
[facility]. PUT SIGNED COPY IN CHART. FAXED CLINICALS [phone].

## 2019-01-23 NOTE — NUR
PROGRESS NOTES - ORTHOPEDICS



Patient seen & examined resting comfortably at bedside.  Patient ambulated to 
door.  Denies numbness, paresthesias or loss of distal motor function.  Pain 
well controlled. No acute events overnight.  Patient seen early afternoon with 
daughter by bedside.



VS 98.8   RR 16 /59 O2 94% RA



Left Hip - Dressing clean, dry and intact

In abduction pillow

Motor: EHL, FHL, TA, G/S

Sensation grossly intact

Pulses + DP, Post tib

Compartments soft

Negative calf tenderness



H/H 10.6/31.5



83 year old F s/p Left Hip Hemiarthroplasty POD #1



Analgesics PRN

DVT Prophylaxis

PT WBAT - Posterior hip precautions

Follow up am labs



DO [facility]

## 2019-01-23 NOTE — NUR
Received patient awake, family members at the bedside, lomax catheter in place. Call light 
within easy reach, advised to call for assistance, patient verbalized understanding, will 
continue to monitor

## 2019-01-24 VITALS — SYSTOLIC BLOOD PRESSURE: 127 MMHG | DIASTOLIC BLOOD PRESSURE: 70 MMHG

## 2019-01-24 VITALS — DIASTOLIC BLOOD PRESSURE: 60 MMHG | SYSTOLIC BLOOD PRESSURE: 126 MMHG

## 2019-01-24 VITALS — DIASTOLIC BLOOD PRESSURE: 62 MMHG | SYSTOLIC BLOOD PRESSURE: 128 MMHG

## 2019-01-24 VITALS — SYSTOLIC BLOOD PRESSURE: 128 MMHG | DIASTOLIC BLOOD PRESSURE: 62 MMHG

## 2019-01-24 LAB
ALBUMIN SERPL-MCNC: 2.1 G/DL (ref 3.5–5)
ALBUMIN/GLOB SERPL: 0.8 {RATIO} (ref 0.8–2)
ALP SERPL-CCNC: 45 IU/L (ref 40–150)
ALT SERPL-CCNC: 6 IU/L (ref 0–55)
ANION GAP SERPL CALC-SCNC: 11 MMOL/L (ref 8–16)
BASOPHILS # BLD AUTO: 0.1 10*3/UL (ref 0–0.1)
BASOPHILS NFR BLD AUTO: 0.6 % (ref 0–1)
BUN SERPL-MCNC: 23 MG/DL (ref 7–26)
BUN/CREAT SERPL: 30 (ref 6–25)
CALCIUM SERPL-MCNC: 8.2 MG/DL (ref 8.4–10.2)
CHLORIDE SERPL-SCNC: 107 MMOL/L (ref 98–107)
CO2 SERPL-SCNC: 28 MMOL/L (ref 22–29)
DEPRECATED NEUTROPHILS # BLD AUTO: 6.4 10*3/UL (ref 2.1–6.9)
EGFRCR SERPLBLD CKD-EPI 2021: > 60 ML/MIN (ref 60–?)
EOSINOPHIL # BLD AUTO: 0.3 10*3/UL (ref 0–0.4)
EOSINOPHIL NFR BLD AUTO: 3.6 % (ref 0–6)
ERYTHROCYTE [DISTWIDTH] IN CORD BLOOD: 14.7 % (ref 11.7–14.4)
GLOBULIN PLAS-MCNC: 2.8 G/DL (ref 2.3–3.5)
GLUCOSE SERPLBLD-MCNC: 92 MG/DL (ref 74–118)
HCT VFR BLD AUTO: 29.5 % (ref 34.2–44.1)
HGB BLD-MCNC: 9.8 G/DL (ref 12–16)
LYMPHOCYTES # BLD: 1.9 10*3/UL (ref 1–3.2)
LYMPHOCYTES NFR BLD AUTO: 19.5 % (ref 18–39.1)
MCH RBC QN AUTO: 31.1 PG (ref 28–32)
MCHC RBC AUTO-ENTMCNC: 33.2 G/DL (ref 31–35)
MCV RBC AUTO: 93.7 FL (ref 81–99)
MONOCYTES # BLD AUTO: 0.8 10*3/UL (ref 0.2–0.8)
MONOCYTES NFR BLD AUTO: 8.7 % (ref 4.4–11.3)
NEUTS SEG NFR BLD AUTO: 67.1 % (ref 38.7–80)
PLATELET # BLD AUTO: 211 X10E3/UL (ref 140–360)
POTASSIUM SERPL-SCNC: 4 MMOL/L (ref 3.5–5.1)
RBC # BLD AUTO: 3.15 X10E6/UL (ref 3.6–5.1)
SODIUM SERPL-SCNC: 142 MMOL/L (ref 136–145)

## 2019-01-24 RX ADMIN — TELMISARTAN SCH MG: 40 TABLET ORAL at 09:00

## 2019-01-24 RX ADMIN — Medication SCH MG: at 09:00

## 2019-01-24 RX ADMIN — CHOLECALCIFEROL (VITAMIN D3) SCH UNIT: 25 TAB ORAL at 09:00

## 2019-01-24 RX ADMIN — HYDROCODONE BITARTRATE AND ACETAMINOPHEN PRN EA: 5; 325 TABLET ORAL at 09:59

## 2019-01-24 RX ADMIN — LEVOTHYROXINE SODIUM SCH MCG: 50 TABLET ORAL at 06:36

## 2019-01-24 RX ADMIN — HYDROCODONE BITARTRATE AND ACETAMINOPHEN PRN EA: 5; 325 TABLET ORAL at 05:46

## 2019-01-24 NOTE — NUR
PT ACCEPTED TO [facility] WITH DR RUSSO. PT SIGNED IMM FILED IN CHART WITH 
COPY LEFT AT BEDSIDE. FILLED OUT POST DISCHARGE FORM FILED IN CHART WITH COPY OF PASRR AND 
PUT ORIGINAL IN PACKET TO GO WITH PT TO [address], [phone].

## 2019-01-24 NOTE — NUR
dsg to left hip changed as per md instructions. pt tolerated well, flu vaccine given as per 
ordered to l deltoid, report called to [facility] , pt going to rm 308

## 2019-01-24 NOTE — NUR
PROGRESS NOTES - ORTHOPEDICS



Patient seen and examined with daughter by bedside.  Pain well controlled with 
analgesics.  Doing well. No acute events overnight.



VS 99.5  RR 16 /62 O2 94% (RA)



Left Hip - Dressing clean, dry and intact

Motor: + EHL, FHL, TA, G/S

Sensation grossly intact

Pulses: + DP, Post tib

Compartments soft

Negative calf tenderness



H/H 9.8/29.5



83 year old F s/p Left Hip Hemiarthroplasty POD #2

Analgesics PRN

DVT Prophylaxis

PT - WBAT, Posterior hip precautions

Orthopedically stable for discharge 

Recommend dressing change to Aquacel prior to discharge

May remove new dressing at 1 week after dressing change and cover with dry 
sterile dressing

Follow up in office in 2 weeks



DO KEITH [facility]

## 2019-01-24 NOTE — DISCHARGE SUMMARY
ADMIT DIAGNOSES 

1.  Left proximal femoral neck fracture secondary to mechanical fall.

2.  Hypertensive heart disease.

3.  Stage 1 chronic kidney disease. 

4.  Osteoporosis.

5.  Generalized osteoarthritis.



DISCHARGE DIAGNOSES 

1.  Status post left hip hemiarthroplasty to repair left proximal femoral 

neck fracture.

2.  Hypertensive heart disease.

3.  Acute renal failure, resolved.

4.  Osteoporosis.

5.  Generalized osteoarthritis.



HOSPITAL COURSE:  This is an 83-year-old white woman who was initially 

admitted to [facility] with the diagnosis 

of left proximal neck fracture that she sustained secondary to mechanical 

fall.  During this hospitalization, she was found to have acute renal 

failure that resolved with intravenous fluids.  The patient was seen by her 

orthopedic surgeon, namely Dr. Pablo Acuna, who performed successful left 

hip hemiarthroplasty, which she tolerated quite well.  During this 

hospitalization, the patient did commence physical therapy, which she 

tolerated well.  The patient's hospitalization was unremarkable.  The 

decision was made to transfer the patient to a local skilled nursing 

facility, namely the [facility] where she could receive 

daily physical therapy, as well as management of her medical comorbidities. 

 The patient's condition on discharge was stable.



DISCHARGE MEDICATIONS 

1.  Norco 5 per 325 mg 1 every 6 hours p.r.n. pain, 30 prescribed.

2.  Gabapentin 600 mg t.i.d. 

3.  Enoxaparin 30 mg subcutaneous daily for a total of 21 days.

4.  Vitamin D3 2000 units daily.

5.  Lisinopril 40 mg daily.

6.  Levothyroxine 50 mcg daily.



FOLLOWUP INSTRUCTIONS:  As previously stated, the patient will transfer to 

a local skilled nursing facility, namely the [facility] 

where she will be under the care of Dr. Thor Barnett.  



 



 _________________________________

KHLOE JC MD



DD:  01/24/2019 06:06

DT:  01/24/2019 06:09

Job#:  [id number]



cc:  PABLO ACUNA MD



[facility]

## 2019-01-24 NOTE — NUR
assessment complete no distress noted,updated on poc vocied understanding, denies pain at 
this time, dsg to l hip c/d/i, r ac 20g no ss of infiltration noted, no other co voiced call 
light in reach will continue to monitor

## 2022-11-14 NOTE — NUR
PT TRANSFERRED FROM ST[facility]CHER TO HOSPITAL BED.  PT TOLERATED WITHOUT 
DIFFICULTY. My signature below certifies that the above stated patient is homebound and upon completion of the Face-To-Face encounter, has the need for intermittent skilled nursing, physical therapy and/or speech or occupational therapy services in their home for their current diagnosis as outlined in their initial plan of care. These services will continue to be monitored by myself or another physician.